# Patient Record
Sex: MALE | Race: WHITE | NOT HISPANIC OR LATINO | Employment: OTHER | URBAN - METROPOLITAN AREA
[De-identification: names, ages, dates, MRNs, and addresses within clinical notes are randomized per-mention and may not be internally consistent; named-entity substitution may affect disease eponyms.]

---

## 2018-01-30 ENCOUNTER — OFFICE VISIT CONVERTED (OUTPATIENT)
Dept: CARDIOLOGY | Facility: CLINIC | Age: 73
End: 2018-01-30
Attending: SPECIALIST

## 2019-02-01 ENCOUNTER — OFFICE VISIT CONVERTED (OUTPATIENT)
Dept: CARDIOLOGY | Facility: CLINIC | Age: 74
End: 2019-02-01
Attending: SPECIALIST

## 2020-05-01 ENCOUNTER — HOSPITAL ENCOUNTER (OUTPATIENT)
Dept: LAB | Facility: HOSPITAL | Age: 75
Discharge: HOME | End: 2020-05-01
Attending: FAMILY MEDICINE

## 2020-05-01 LAB
ALT SERPL-CCNC: 28 U/L (ref 10–40)
AST SERPL-CCNC: 28 U/L (ref 15–50)
CHOLEST SERPL-MCNC: 151 MG/DL (ref 107–200)
CHOLEST/HDLC SERPL: 2.4 {RATIO} (ref 3–6)
HDLC SERPL-MCNC: 62 MG/DL (ref 40–60)
LDLC SERPL CALC-MCNC: 78 MG/DL (ref 70–100)
TRIGL SERPL-MCNC: 55 MG/DL (ref 40–150)
VLDLC SERPL-MCNC: 11 MG/DL (ref 5–37)

## 2020-05-29 ENCOUNTER — CONVERSION ENCOUNTER (OUTPATIENT)
Dept: CARDIOLOGY | Facility: CLINIC | Age: 75
End: 2020-05-29

## 2020-05-29 ENCOUNTER — OFFICE VISIT CONVERTED (OUTPATIENT)
Dept: CARDIOLOGY | Facility: CLINIC | Age: 75
End: 2020-05-29
Attending: SPECIALIST

## 2020-07-01 ENCOUNTER — HOSPITAL ENCOUNTER (OUTPATIENT)
Dept: MRI IMAGING | Facility: HOSPITAL | Age: 75
Discharge: HOME | End: 2020-07-01
Attending: FAMILY MEDICINE

## 2020-08-20 ENCOUNTER — OFFICE VISIT CONVERTED (OUTPATIENT)
Dept: NEUROSURGERY | Facility: CLINIC | Age: 75
End: 2020-08-20
Attending: NEUROLOGICAL SURGERY

## 2020-10-23 ENCOUNTER — HOSPITAL ENCOUNTER (OUTPATIENT)
Dept: PREADMISSION TESTING | Facility: HOSPITAL | Age: 75
Discharge: HOME | End: 2020-10-23
Attending: NEUROLOGICAL SURGERY

## 2020-10-26 LAB — SARS-COV-2 RNA SPEC QL NAA+PROBE: NOT DETECTED

## 2020-10-28 ENCOUNTER — HOSPITAL ENCOUNTER (OUTPATIENT)
Dept: PERIOP | Facility: HOSPITAL | Age: 75
Setting detail: HOSPITAL OUTPATIENT SURGERY
Discharge: HOME | End: 2020-10-28
Attending: NEUROLOGICAL SURGERY

## 2020-11-19 ENCOUNTER — OFFICE VISIT CONVERTED (OUTPATIENT)
Dept: NEUROSURGERY | Facility: CLINIC | Age: 75
End: 2020-11-19
Attending: PHYSICIAN ASSISTANT

## 2021-01-13 ENCOUNTER — HOSPITAL ENCOUNTER (OUTPATIENT)
Dept: OTHER | Facility: HOSPITAL | Age: 76
Discharge: HOME | End: 2021-01-13
Attending: INTERNAL MEDICINE

## 2021-02-09 ENCOUNTER — HOSPITAL ENCOUNTER (OUTPATIENT)
Dept: VACCINE CLINIC | Facility: HOSPITAL | Age: 76
Discharge: HOME | End: 2021-02-09
Attending: INTERNAL MEDICINE

## 2021-05-10 NOTE — H&P
History and Physical      Patient Name: Danny Villafana   Patient ID: [id number]   Sex: Male   YOB: 1945    Primary Care Provider: Gurinder Marcos MD   Referring Provider: Gurinder Marcos MD    Visit Date: August 20, 2020    Provider: Sarmad Triplett MD   Location: [facility]   Location Address: [address] [place] [address]  [id number]   Location Phone: [phone]          Chief Complaint  · Back and right leg pain  · Surgical History and Physical      History Of Present Illness  The patient is a 74 year old /White male, who presents on referral from Gurinder Marcos MD, for a neurosurgical evaluation of low back pain and right leg pain.   The pain developed acutely several years ago. It went away with PT on 2 previous occasions and started to come back in June of this year (PT not helpful this time). It is 5-7/10 in severity has an aching, a burning, and quality and radiates into the bilateral lower leg (right greater than left, left mostly to the hip) in a nonspecific distribution The pain has been waxing and waning in severity. The pain tends to be maximal at no specific time, but waxes and wanes in severity throughout the day. The patient states the pain is aggravated by bending and leaning forward. It is alleviated by Etodolac.   He denies any additional symptoms. The patient has no prior history of neck or back surgery.   RECENT INTERVENTIONS:  He has been previously treated with multiple rounds of PT in the past, home exercises have not helped as much this time. Etodolac is somewhat helpful.   INFORMATION REVIEWED:  The following information was reviewed: radiology reports and images and. MRI of the lumbar spine and revealed spinal stenosis at L4-5 (severe) and L3-4 (moderate).       Past Medical History  Allergic rhinitis, chronic; Arthritis; Cancer; GERD; High cholesterol; Hyperlipidemia         Past Surgical History  *Metal  Implant; EYE SURGERY; MENISCUS TEAR         Medication List  Aspir-81 81 mg oral tablet,delayed release (DR/EC); atorvastatin 10 mg oral tablet; cetirizine 10 mg oral tablet; etodolac 500 mg oral tablet; fluticasone propionate 50 mcg/actuation nasal spray,suspension; Glucosamine Chondroitin 550-30-1 mg oral capsule; multivitamin oral capsule; resveratrol 250 mg oral capsule         Allergy List  SULFA (SULFONAMIDES)       Allergies Reconciled  Family Medical History  Diabetes, unspecified type; Family history of breast cancer; Family history of cancer; Family history of heart disease; Family history of diabetes mellitus; Family history of osteoporosis         Social History  Alcohol (Current some day); Caffeine (Current every day); lives with spouse; ; Second hand smoke exposure (Never); Tobacco (Former); Working         Review of Systems  · Constitutional  o Denies  o : chills, excessive sweating, fatigue, fever, sycope/passing out, weight gain, weight loss  · Eyes  o Denies  o : changes in vision, blurry vision, double vision  · HENT  o Admits  o : seasonal allergies  o Denies  o : loss of hearing, ringing in the ears, ear aches, sore throat, nasal congestion, sinus pain, nose bleeds  · Cardiovascular  o Denies  o : blood clots, swollen legs, anemia, easy burising or bleeding, transfusions  · Respiratory  o Denies  o : shortness of breath, dry cough, productive cough, pneumonia, COPD  · Gastrointestinal  o Admits  o : reflux  o Denies  o : difficulty swallowing  · Genitourinary  o Denies  o : incontinence  · Neurologic  o Admits  o : tremor  o Denies  o : headache, seizure, stroke, loss of balance, falls, dizziness/vertigo, difficulty with sleep, numbness/tingling/paresthesia , difficulty with coordination, difficulty with dexterity, weakness  · Musculoskeletal  o Admits  o : sciatica, pain radiating in leg, low back pain  o Denies  o : neck stiffness/pain, swollen lymph nodes, muscle aches, joint pain,  "weakness, spasms, pain radiating in arm  · Endocrine  o Denies  o : diabetes, thyroid disorder  · Psychiatric  o Denies  o : anxiety, depression      Vitals  Date Time BP Position Site L\R Cuff Size HR RR TEMP (F) WT  HT  BMI kg/m2 BSA m2 O2 Sat HC       08/20/2020 08:52 AM        96.7 171lbs 8oz 6'  1\" 22.63 2           Physical Examination  · Constitutional  o Appearance  o : well-nourished, well developed, alert, in no acute distress  · Respiratory  o Respiratory Effort  o : breathing unlabored  · Cardiovascular  o Peripheral Vascular System  o :   § Extremities  § : no edema or cyanosis  · Musculoskeletal  o Spine  o :   § Inspection/Palpation  § : no spinal tenderness or misalignment  o Right Lower Extremity  o :   § Inspection/Palpation  § : no joint or limb tenderness to palpation, no edema present, no ecchymosis  § Joint Stability  § : joint stability within normal limits  § Range of Motion  § : range of motion normal, no joint crepitations present, no pain on motion, Ghulam's test negative  o Left Lower Extremity  o :   § Inspection/Palpation  § : no joint or limb tenderness to palpation, no edema present, no ecchymosis  § Joint Stability  § : joint stability within normal limits  § Range of Motion  § : range of motion normal, no joint crepitations present, no pain on motion, Ghulam's test negative  · Skin and Subcutaneous Tissue  o Extremities  o :   § Right Lower Extremity  § : no lesions or areas of discoloration  § Left Lower Extremity  § : no lesions or areas of discoloration  o Back  o : no lesions or areas of discoloration  · Neurologic  o Mental Status Examination  o :   § Orientation  § : alert and oriented to time, person, place and events  o Motor Examination  o :   § RLE Strength  § : strength normal  § RLE Motor Function  § : tone normal, no atrophy, no abnormal movements noted  § LLE Strength  § : strength normal  § LLE Motor Function  § : tone normal, no atrophy, no abnormal movements " noted  o Reflexes  o :   § RLE  § : knee and ankle reflexes tr/4, SLR negative  § LLE  § : knee and ankle reflexes tr/4, SLR negative  o Sensation  o :   § Light Touch  § : sensation intact to light touch in extremities  o Gait and Station  o :   § Gait Screening  § : normal gait, able to stand without difficulty  · Psychiatric  o Mood and Affect  o : mood normal, affect appropriate              Assessment  · Lumbar Spinal Stenosis     724.02/M48.06  L4-5 moreso than L3-4  · Preoperative examination     V72.84/Z01.818    Problems Reconciled  Plan  · Orders  o PHYSICAL THERAPY CONSULTATION (PHYTH) - - 08/20/2020   Prefers PTA Lumbar spinal stenosis Evaluate and treat  · Medications  o Medications have been Reconciled  o Transition of Care or Provider Policy  · Instructions  o Encouraged to follow-up with Primary Care Provider for preventative care.  o The ROS and the PFSH were reviewed at today's visit.  o I have discussed the risks and benefits of surgery versus physical therapy, epidural steroids, and other conservative forms of treatment.  o He could consider a right approach for minimally invasive laminectomy at L3-4 and L4-5. His other option is PT.   o He would like to try a little course of PT and if not improving quickly consider surgery after October 17th. He will call with an update.  o ***Patient called back and has failed PT. Desires surgery***  o ****Surgical Orders****  o Outpatient  o RISK AND BENEFITS:  o Possible risks/complications, benefits and alternatives to surgical or invasive procedure have been explained to the patient and/or legal guardian.  o I certify that inpatient hospital services are in accordance with 42.C.F.R. & 412.3 i.e. the order.  o PREP: Per protocol;  o IV: Per Anesthesia;  o *******************************************  o PRE- OP MEDICATION ORDER:  o *******************************************  o Kefzol 2 grams IV on call to OR.  o ***************  o Date of Surgical Procedure:    o Please sign permit for:  o Minimally Invasive Laminectomy, right approach  o lumbar three to lumbar four  o lumbar four to lumbar five  o The above History and Physical must have been completed within 30 days of admission.            Electronically Signed by: Sarmad Triplett MD -Author on September 22, 2020 10:28:53 AM

## 2021-05-13 NOTE — PROGRESS NOTES
"   Progress Note      Patient Name: Danny Villafana   Patient ID: [id number]   Sex: Male   YOB: 1945    Primary Care Provider: Gurinder Marcos MD   Referring Provider: Gurinder Marcos MD    Visit Date: May 29, 2020    Provider: Levi Leiva MD   Location: [facility]   Location Address: [address] [place] [address]  [id number]   Location Phone: [phone]          Chief Complaint     Hyperlipidemia.  Hypertension.       History Of Present Illness  Danny Villafana is a 74 year old /White male with a history of hyperlipidemia and chest pain. No further chest pain. Blood pressure is running slightly on the higher side.   CURRENT MEDICATIONS: Aspirin 81 mg daily; atorvastatin 10 mg daily; cetirizine 10 mg daily; etodolac 500 mg 1/2 b.i.d.; fluticasone nasal spray daily; glucosamine-chondroitin 2 daily; multivitamin daily; resveratrol 300 mg daily. The dosage and frequency of the medications were reviewed with the patient.   PAST MEDICAL HISTORY: Arthritis; Atypical chest pain; Cancer; GERD; Hyperlipidemia.   FAMILY HISTORY: Positive for diabetes mellitus and heart disease. Negative for hypertension.   PSYCHOSOCIAL HISTORY: Denies mood changes or depression. Moderate alcohol consumption. Previously smoked, but quit. Walks daily for exercise.       Review of Systems  · Cardiovascular  o Admits  o : swelling (feet, ankles, hands)  o Denies  o : palpitations (fast, fluttering, or skipping beats), shortness of breath while walking or lying flat, chest pain or angina pectoris   · Respiratory  o Denies  o : chronic or frequent cough, asthma or wheezing      Vitals  Date Time BP Position Site L\R Cuff Size HR RR TEMP (F) WT  HT  BMI kg/m2 BSA m2 O2 Sat HC       05/29/2020 01:04 /80 Sitting    66 - R   172lbs 0oz 6'  1\" 22.69 2     05/29/2020 01:04 /78 Sitting                     Physical " Examination  · Constitutional  o Appearance  o : Awake, alert, cooperative, pleasant.  · Respiratory  o Inspection of Chest  o : No chest wall deformities, moving equal.  o Auscultation of Lungs  o : Good air entry with vesicular breath sounds.  · Cardiovascular  o Heart  o :   § Auscultation of Heart  § : S1 and S2 regular. No S3. No S4. No murmurs.  o Peripheral Vascular System  o :   § Extremities  § : Peripheral pulses were well felt. No edema. No cyanosis.  · Gastrointestinal  o Abdominal Examination  o : No masses or organomegaly noted.          Assessment     ASSESSMENT & PLAN:    1.  Atypical chest pain, stable.  2.  Hypertension, blood pressure running slightly high.  I asked him to monitor his blood pressure regularly.  If        persistently high, start him on losartan 50 mg once a day.    3.  Hyperlipidemia.  Continue current dose of Lipitor, managed by PMD.  4.  See me back in 6 months.             Electronically Signed by: Judi Hutchinson-, Other -Author on June 8, 2020 10:53:00 AM  Electronically Co-signed by: Levi Leiva MD -Reviewer on June 21, 2020 10:52:13 AM

## 2021-05-13 NOTE — PROGRESS NOTES
Progress Note      Patient Name: Danny Villafana   Patient ID: [id number]   Sex: Male   YOB: 1945    Primary Care Provider: Gurinder Marcos MD   Referring Provider: Gurinder Marcos MD    Visit Date: November 19, 2020    Provider: Inna Denny PA-C   Location: [facility]   Location Address: [address] [place] [address]  [id number]   Location Phone: [phone]          Chief Complaint  · Follow-up      History Of Present Illness  The patient is a 74 year old /White male who is in the office for followup appointment.      Here for three week post op visit s//p a right L3/4 and L4/5 MIL. He is doing well.  Denies leg pain.  Denies fever or drainage from the wound.       Past Medical History  Allergic rhinitis, chronic; Arthritis; Cancer; GERD; High cholesterol; Hyperlipidemia         Past Surgical History  *Metal Implant; EYE SURGERY; Lumbar laminectomy; MENISCUS TEAR         Medication List  Aspir-81 81 mg oral tablet,delayed release (DR/EC); atorvastatin 10 mg oral tablet; cetirizine 10 mg oral tablet; etodolac 500 mg oral tablet; fluticasone propionate 50 mcg/actuation nasal spray,suspension; Glucosamine Chondroitin 550-30-1 mg oral capsule; multivitamin oral capsule; resveratrol 250 mg oral capsule         Allergy List  SULFA (SULFONAMIDES)       Allergies Reconciled  Family Medical History  Diabetes, unspecified type; Family history of breast cancer; Family history of cancer; Family history of heart disease; Family history of diabetes mellitus; Family history of osteoporosis         Social History  Alcohol (Current some day); Caffeine (Current every day); lives with spouse; ; Second hand smoke exposure (Never); Tobacco (Former); Working         Review of Systems  · Constitutional  o Denies  o : chills, excessive sweating, fatigue, fever, sycope/passing out, weight gain, weight loss  · Eyes  o Denies  o : changes in vision,  blurry vision, double vision  · HENT  o Denies  o : loss of hearing, ringing in the ears, ear aches, sore throat, nasal congestion, sinus pain, nose bleeds, seasonal allergies  · Cardiovascular  o Denies  o : blood clots, swollen legs, anemia, easy burising or bleeding, transfusions  · Respiratory  o Denies  o : shortness of breath, dry cough, productive cough, pneumonia, COPD  · Gastrointestinal  o Denies  o : difficulty swallowing, reflux  · Genitourinary  o Denies  o : incontinence  · Neurologic  o Denies  o : headache, seizure, stroke, tremor, loss of balance, falls, dizziness/vertigo, difficulty with sleep, numbness/tingling/paresthesia , difficulty with coordination, difficulty with dexterity, weakness  · Musculoskeletal  o Admits  o : low back pain  o Denies  o : neck stiffness/pain, swollen lymph nodes, muscle aches, joint pain, weakness, spasms, sciatica, pain radiating in arm, pain radiating in leg  · Endocrine  o Denies  o : diabetes, thyroid disorder  · Psychiatric  o Denies  o : anxiety, depression  · All Others Negative      Vitals  Date Time BP Position Site L\R Cuff Size HR RR TEMP (F) WT  HT  BMI kg/m2 BSA m2 O2 Sat FR L/min FiO2        11/19/2020 03:31 /72 Sitting    80 - R  97.3 170lbs 5oz 6'   23.1 1.98             Physical Examination  · Constitutional  o Appearance  o : well-nourished, well developed, alert, in no acute distress  · Respiratory  o Respiratory Effort  o : breathing unlabored  · Cardiovascular  o Peripheral Vascular System  o :   § Extremities  § : no cyanosis, clubbing or edema  · Neurologic  o Mental Status Examination  o :   § Orientation  § : grossly oriented to person, place and time  o Motor Examination  o :   § RLE Strength  § : strength normal  § RLE Motor Function  § : tone normal, no atrophy, no abnormal movements noted  § LLE Strength  § : strength normal  § LLE Motor Function  § : tone normal, no atrophy, no abnormal movements noted  o Reflexes  o :    § RLE  § : 2/4 knee and ankle reflex  § LLE  § : 2/4 knee and ankle reflex  o Sensation  o :   § Light Touch  § : sensation intact to light touch in extremities  o Gait and Station  o :   § Gait Screening  § : gait within normal limits  · Psychiatric  o Mood and Affect  o : mood normal, affect appropriate     lumbar incisions have healed and no signs of infection           Assessment  · Lumbar Spinal Stenosis     724.02/M48.06  L4-5 moreso than L3-4 (now s/p MIL)      Plan  · Medications  o Medications have been Reconciled  o Transition of Care or Provider Policy  · Instructions  o The ROS and the PFSH were reviewed at today's visit.  o Call or return to office if symptoms worsen or persist.   o He will slowly increase activity over the next 2-3 weeks. F/U as needed.             Electronically Signed by: Inna Denny PA-C -Author on November 19, 2020 04:13:10 PM

## 2021-05-14 VITALS
SYSTOLIC BLOOD PRESSURE: 122 MMHG | BODY MASS INDEX: 23.07 KG/M2 | WEIGHT: 170.31 LBS | DIASTOLIC BLOOD PRESSURE: 72 MMHG | HEART RATE: 80 BPM | TEMPERATURE: 97.3 F | HEIGHT: 72 IN

## 2021-05-15 VITALS — HEIGHT: 73 IN | WEIGHT: 171.5 LBS | BODY MASS INDEX: 22.73 KG/M2 | TEMPERATURE: 96.7 F

## 2021-05-15 VITALS
SYSTOLIC BLOOD PRESSURE: 144 MMHG | WEIGHT: 172 LBS | DIASTOLIC BLOOD PRESSURE: 80 MMHG | BODY MASS INDEX: 22.8 KG/M2 | HEART RATE: 66 BPM | HEIGHT: 73 IN

## 2021-05-16 VITALS
HEIGHT: 73 IN | HEART RATE: 60 BPM | BODY MASS INDEX: 23.19 KG/M2 | SYSTOLIC BLOOD PRESSURE: 138 MMHG | WEIGHT: 175 LBS | DIASTOLIC BLOOD PRESSURE: 82 MMHG

## 2021-05-16 VITALS
HEIGHT: 73 IN | HEART RATE: 72 BPM | DIASTOLIC BLOOD PRESSURE: 80 MMHG | BODY MASS INDEX: 23.33 KG/M2 | WEIGHT: 176 LBS | SYSTOLIC BLOOD PRESSURE: 154 MMHG

## 2021-06-01 ENCOUNTER — CONVERSION ENCOUNTER (OUTPATIENT)
Dept: CARDIOLOGY | Facility: CLINIC | Age: 76
End: 2021-06-01

## 2021-06-01 ENCOUNTER — OFFICE VISIT CONVERTED (OUTPATIENT)
Dept: CARDIOLOGY | Facility: CLINIC | Age: 76
End: 2021-06-01
Attending: SPECIALIST

## 2021-06-05 NOTE — PROGRESS NOTES
Progress Note      Patient Name: Danny Villafana   Patient ID: [id number]   Sex: Male   YOB: 1945    Primary Care Provider: Gurinder Marcos MD   Referring Provider: Gurinder Marcos MD    Visit Date: June 1, 2021    Provider: Levi Leiva MD   Location: [facility]   Location Address: [address] [place] [address], [facility]   [place], [place]  [id number]   Location Phone: [phone]          Chief Complaint     Hypertension, hyperlipidemia.       History Of Present Illness  Danny Villafana is a 75 year old /White male with a history of hypertension and hyperlipidemia. No chest pain. No shortness of breath.   CURRENT MEDICATIONS: Medication list was reviewed and is as documented.   PAST MEDICAL HISTORY: Arthritis; Atypical chest pain; Cancer; GERD; Hyperlipidemia.   PSYCHOSOCIAL HISTORY: Moderate alcohol consumption. Previously smoked, but quit.      ALLERGIES:  Sulfa.       Review of Systems  · Cardiovascular  o Admits  o : swelling (feet, ankles, hands)  o Denies  o : palpitations (fast, fluttering, or skipping beats), shortness of breath while walking or lying flat, chest pain or angina pectoris   · Respiratory  o Denies  o : chronic or frequent cough      Vitals  Date Time BP Position Site L\R Cuff Size HR RR TEMP (F) WT  HT  BMI kg/m2 BSA m2 O2 Sat FR L/min FiO2 HC       06/01/2021 12:09 /76 Sitting    70 - R   168lbs 0oz 6'   22.78 1.97             Physical Examination  · Constitutional  o Appearance  o : Awake, alert, cooperative, pleasant.  · Respiratory  o Inspection of Chest  o : No chest wall deformities, moving equal.  o Auscultation of Lungs  o : Clear.  · Cardiovascular  o Heart  o :   § Auscultation of Heart  § : S1 and S2 regular. No S3. No S4.   o Peripheral Vascular System  o :   § Extremities  § : No edema.   · Gastrointestinal  o Abdominal Examination  o : No masses or organomegaly noted.  · EKG  o EKG  o : Performed in the office  today.  o Indications  o : Hypertension.  o Results  o : Sinus rhythm, low voltage.   o Comparison  o : No change from previous EKG.           Assessment     ASSESSMENT AND PLAN:  1. Essential hypertension, controlled.  Hypertensive cardiovascular disease without heart failure.  Start him on Losartan 50 mg once a day and be on a low salt diet.    2. Hyperlipidemia.  Continue Lipitor.  Lipid profile managed by PMD.    3. See me back in six months.    MD ALEKSEY Roberts/lm                Electronically Signed by: Dary Campbell-, Other -Author on June 2, 2021 07:57:18 AM  Electronically Co-signed by: Levi Leiva MD -Reviewer on June 2, 2021 12:38:32 PM

## 2021-07-15 VITALS
BODY MASS INDEX: 22.75 KG/M2 | HEIGHT: 72 IN | WEIGHT: 168 LBS | SYSTOLIC BLOOD PRESSURE: 146 MMHG | HEART RATE: 70 BPM | DIASTOLIC BLOOD PRESSURE: 76 MMHG

## 2021-10-09 PROCEDURE — 87086 URINE CULTURE/COLONY COUNT: CPT | Performed by: FAMILY MEDICINE

## 2021-11-29 ENCOUNTER — TRANSCRIBE ORDERS (OUTPATIENT)
Dept: ADMINISTRATIVE | Facility: HOSPITAL | Age: 76
End: 2021-11-29

## 2021-11-29 DIAGNOSIS — M54.42 LOW BACK PAIN DUE TO BILATERAL SCIATICA: Primary | ICD-10-CM

## 2021-11-29 DIAGNOSIS — M54.41 LOW BACK PAIN DUE TO BILATERAL SCIATICA: Primary | ICD-10-CM

## 2021-12-03 ENCOUNTER — HOSPITAL ENCOUNTER (OUTPATIENT)
Dept: MRI IMAGING | Facility: HOSPITAL | Age: 76
Discharge: HOME | End: 2021-12-03
Admitting: FAMILY MEDICINE

## 2021-12-03 DIAGNOSIS — M54.41 LOW BACK PAIN DUE TO BILATERAL SCIATICA: ICD-10-CM

## 2021-12-03 DIAGNOSIS — M54.42 LOW BACK PAIN DUE TO BILATERAL SCIATICA: ICD-10-CM

## 2021-12-03 PROCEDURE — 72148 MRI LUMBAR SPINE W/O DYE: CPT

## 2022-01-20 PROBLEM — I10 HYPERTENSION, ESSENTIAL: Status: ACTIVE | Noted: 2022-01-20

## 2022-01-20 PROBLEM — E78.2 HYPERLIPEMIA, MIXED: Status: ACTIVE | Noted: 2022-01-20

## 2022-01-20 NOTE — PROGRESS NOTES
[facility]  Cardiology progress Note    Patient Name: Danny Villafana  : 1945    CHIEF COMPLAINT  Hypertension      Subjective   Subjective     HISTORY OF PRESENT ILLNESS    Danny Villafana is a 76 y.o. male with history of hypertension and hyperlipidemia.  No chest pain or shortness of breath.    Review of Systems:   Constitutional no fever,  no weight loss   Skin no rash   Otolaryngeal no difficulty swallowing   Cardiovascular See HPI   Pulmonary no cough, no sputum production   Gastrointestinal no constipation, no diarrhea   Genitourinary no dysuria, no hematuria   Hematologic no easy bruisability, no abnormal bleeding   Musculoskeletal no muscle pain   Neurologic no dizziness, no falls         Personal History     Social History:  reports that he has never smoked. He has never used smokeless tobacco. He reports previous alcohol use. He reports that he does not use drugs.    Home Medications:  Current Outpatient Medications on File Prior to Visit   Medication Sig   • aspirin (aspirin) 81 MG EC tablet Aspir-81 81 mg oral tablet,delayed release (DR/EC) take 1 tablet (81 mg) by oral route once daily   Active   • atorvastatin (LIPITOR) 10 MG tablet atorvastatin 10 mg oral tablet take 1 tablet (10 mg) by oral route once daily at bedtime   Active   • Cetirizine HCl 10 MG capsule Take 1 capsule by mouth Daily.   • etodolac XL (LODINE XL) 500 MG 24 hr tablet Take 250 mg by mouth.   • fluticasone (FLONASE) 50 MCG/ACT nasal spray fluticasone propionate 50 mcg/actuation nasal spray,suspension spray 1 spray (50 mcg) in each nostril by intranasal route once daily   Active   • [DISCONTINUED] losartan (COZAAR) 50 MG tablet Take 50 mg by mouth Daily.   • [DISCONTINUED] ciprofloxacin (CIPRO) 500 MG tablet Take 1 tablet by mouth 2 (Two) Times a Day.     No current facility-administered medications on file prior to visit.     Allergies:  Allergies   Allergen Reactions   • Sulfa Antibiotics Rash       Objective     Objective       Vitals:   Heart Rate:  [76-80] 80  BP: (164-168)/(66-70) 168/66  Body mass index is 23.6 kg/m².     Physical Exam:   Constitutional: Awake, alert, No acute distress    Eyes: PERRLA, sclerae anicteric, no conjunctival injection   HENT: NCAT, mucous membranes moist   Neck: Supple, no thyromegaly, no lymphadenopathy, trachea midline   Respiratory: Clear to auscultation bilaterally, nonlabored respirations    Cardiovascular: RRR, no murmurs or rubs. Palpable pedal pulses bilaterally   Musculoskeletal: No bilateral ankle edema, no cyanosis to extremities   Psychiatric: Appropriate affect, cooperative   Neurologic: Oriented x 3, strength symmetric in all extremities, Cranial Nerves grossly intact to confrontation, speech clear   Skin: No rashes.    Result Review    Result Review:  I have personally reviewed the available results from  [x]  Laboratory  [x]  EKG  [x]  Cardiology  [x]  Medications  [x]  Old records  []  Other:   Procedures  No results found for: CHOL  Lab Results   Component Value Date    TRIG 55 05/01/2020     Lab Results   Component Value Date    HDL 62 (H) 05/01/2020     Lab Results   Component Value Date    LDL 78 05/01/2020     Lab Results   Component Value Date    VLDL 11 05/01/2020        Impression/Plan:  1.  Essential hypertension Uncontrolled: He says his blood pressure is high in view of his back pain.  Increase losartan 200 mg a day.  Monitor blood pressure regularly at home.  If blood pressures persistently high will add amlodipine 5 mg once a day.  2.  Hyperlipidemia: Continue Lipitor 10 mg once a day.  Lipid profile reviewed.           Levi Leiva MD   01/21/22   09:33 EST

## 2022-01-21 ENCOUNTER — OFFICE VISIT (OUTPATIENT)
Dept: CARDIOLOGY | Facility: CLINIC | Age: 77
End: 2022-01-21

## 2022-01-21 VITALS
HEART RATE: 80 BPM | HEIGHT: 72 IN | BODY MASS INDEX: 23.57 KG/M2 | SYSTOLIC BLOOD PRESSURE: 168 MMHG | WEIGHT: 174 LBS | DIASTOLIC BLOOD PRESSURE: 66 MMHG

## 2022-01-21 DIAGNOSIS — E78.2 HYPERLIPEMIA, MIXED: ICD-10-CM

## 2022-01-21 DIAGNOSIS — I10 HYPERTENSION, ESSENTIAL: Primary | ICD-10-CM

## 2022-01-21 PROCEDURE — 99214 OFFICE O/P EST MOD 30 MIN: CPT | Performed by: SPECIALIST

## 2022-01-21 RX ORDER — LOSARTAN POTASSIUM 100 MG/1
100 TABLET ORAL DAILY
Qty: 90 TABLET | Refills: 3 | Status: SHIPPED | OUTPATIENT
Start: 2022-01-21 | End: 2022-08-19 | Stop reason: SDUPTHER

## 2022-03-08 ENCOUNTER — OFFICE VISIT (OUTPATIENT)
Dept: NEUROSURGERY | Facility: CLINIC | Age: 77
End: 2022-03-08

## 2022-03-08 VITALS
WEIGHT: 175.8 LBS | BODY MASS INDEX: 23.81 KG/M2 | DIASTOLIC BLOOD PRESSURE: 67 MMHG | HEIGHT: 72 IN | SYSTOLIC BLOOD PRESSURE: 142 MMHG

## 2022-03-08 DIAGNOSIS — M51.26 HERNIATED NUCLEUS PULPOSUS, L4-5 LEFT: Primary | ICD-10-CM

## 2022-03-08 PROCEDURE — 99213 OFFICE O/P EST LOW 20 MIN: CPT | Performed by: NEUROLOGICAL SURGERY

## 2022-03-08 RX ORDER — PHENOL 1.4 %
AEROSOL, SPRAY (ML) MUCOUS MEMBRANE
COMMUNITY

## 2022-03-08 RX ORDER — MULTIVITAMIN/IRON/FOLIC ACID 18MG-0.4MG
TABLET ORAL
COMMUNITY
End: 2022-08-19 | Stop reason: ALTCHOICE

## 2022-03-08 RX ORDER — ACETAMINOPHEN 500 MG
500 TABLET ORAL EVERY 6 HOURS PRN
COMMUNITY

## 2022-03-08 RX ORDER — GLUCOSAMINE SULFATE 500 MG
CAPSULE ORAL
COMMUNITY

## 2022-03-08 RX ORDER — MULTIPLE VITAMINS W/ MINERALS TAB 9MG-400MCG
1 TAB ORAL DAILY
COMMUNITY

## 2022-03-08 NOTE — PROGRESS NOTES
Danny Villafana is a 76 y.o. male that presents with Back Pain       He has had pain into the left leg since October when lifting a battery from the boat. It initially went to just below the ankle. He had surgery on the right side in 10/2020 (right approach L3-4 and L4-5 MIL).      Review of Systems   Musculoskeletal: Positive for back pain and myalgias.        Vitals:    03/08/22 1358   BP: 142/67        Physical Exam  Constitutional:       Appearance: He is normal weight.   Musculoskeletal:         General: No tenderness.      Comments: SLR-   Neurological:      Mental Status: He is alert.      Sensory: No sensory deficit.      Motor: No weakness.      Deep Tendon Reflexes: Reflexes normal (1/4).   Psychiatric:         Mood and Affect: Mood normal.        I personally reviewed the patient's MRI scan which shows a superior left L4-5 disc herniation.       Assessment and Plan {CC Problem List  Visit Diagnosis  ROS  Review (Popup)  Health Maintenance  Quality  BestPractice  Medications  SmartSets  SnapShot Encounters  Media :23}   Problem List Items Addressed This Visit    None     Visit Diagnoses     Herniated nucleus pulposus, L4-5 left    -  Primary      As his symptoms have improved somewhat, he would like to hold off on surgery (Left L4-5 min invasive discectomy). He will call with worsened pain. He could consider LESB.    Follow Up {Instructions Charge Capture  Follow-up Communications :23}   Return if symptoms worsen or fail to improve.

## 2022-08-18 NOTE — PROGRESS NOTES
[facility]  Cardiology progress Note    Patient Name: Danny Villafana  : 1945    CHIEF COMPLAINT  Hypertension        Subjective   Subjective     HISTORY OF PRESENT ILLNESS    Danny Villafana is a 76 y.o. male with history of hypertension hyperlipidemia.  No chest pain or shortness of breath.    REVIEW OF SYSTEMS    Constitutional:    No fever, no weight loss  Skin:     No rash  Otolaryngeal:    No difficulty swallowing  Cardiovascular:  No chest pain or shortness of breath  Pulmonary:    No cough, no sputum production    Personal History     Social History:    reports that he quit smoking about 51 years ago. His smoking use included cigarettes. He started smoking about 57 years ago. He has a 6.00 pack-year smoking history. He has never used smokeless tobacco. He reports previous alcohol use. He reports that he does not use drugs.    Home Medications:  Current Outpatient Medications on File Prior to Visit   Medication Sig   • acetaminophen (TYLENOL) 500 MG tablet Take 500 mg by mouth Every 6 (Six) Hours As Needed for Mild Pain .   • aspirin 81 MG EC tablet Aspir-81 81 mg oral tablet,delayed release (DR/EC) take 1 tablet (81 mg) by oral route once daily   Active   • atorvastatin (LIPITOR) 10 MG tablet atorvastatin 10 mg oral tablet take 1 tablet (10 mg) by oral route once daily at bedtime   Active   • Cetirizine HCl 10 MG capsule Take 1 capsule by mouth Daily.   • etodolac XL (LODINE XL) 500 MG 24 hr tablet Take 250 mg by mouth.   • fluticasone (FLONASE) 50 MCG/ACT nasal spray fluticasone propionate 50 mcg/actuation nasal spray,suspension spray 1 spray (50 mcg) in each nostril by intranasal route once daily   Active   • Glucosamine 500 MG capsule Take  by mouth.   • multivitamin with minerals tablet tablet Take 1 tablet by mouth Daily.   • Resveratrol 250 MG capsule Take  by mouth.   • [DISCONTINUED] losartan (COZAAR) 100 MG tablet Take 1 tablet by mouth Daily.   • [DISCONTINUED]  Glucosamine-Chondroitin 9738-2378 MG/30ML liquid Take  by mouth.     No current facility-administered medications on file prior to visit.       Past Medical History:   Diagnosis Date   • Arthritis 2006   • Cancer (HCC)    • Hyperlipidemia    • Hypertension    • Low back pain    • Lumbosacral disc disease 10/06/21       Allergies:  Allergies   Allergen Reactions   • Shellfish-Derived Products Hives   • Sulfa Antibiotics Rash       Objective    Objective       Vitals:   Heart Rate:  [68] 68  BP: (122)/(76) 122/76  Body mass index is 23.46 kg/m².     PHYSICAL EXAM:    General Appearance:   · well developed  · well nourished  HENT:   · oropharynx moist  · lips not cyanotic  Neck:  · thyroid not enlarged  · supple  Respiratory:  · no respiratory distress  · normal breath sounds  · no rales  Cardiovascular:  · no jugular venous distention  · regular rhythm  · apical impulse normal  · S1 normal, S2 normal  · no S3, no S4   · no murmur  · no rub, no thrill  · carotid pulses normal; no bruit  · pedal pulses normal  · lower extremity edema: none    Skin:   · warm, dry  Psychiatric:  · judgement and insight appropriate  · normal mood and affect        Result Review:  I have personally reviewed the available results from  [x]  Laboratory  [x]  EKG  [x]  Cardiology  [x]  Medications  [x]  Old records  []  Other:        Impression/Plan:  1.  Essential hypertension controlled: Continue losartan 100 mg a day.  Blood pressure controlled at home.  2.  Hyperlipidemia: Continue Lipitor 10 mg a day.  Monitor lipid and hepatic profile.           Levi Leiva MD   08/19/22   09:32 EDT

## 2022-08-19 ENCOUNTER — OFFICE VISIT (OUTPATIENT)
Dept: CARDIOLOGY | Facility: CLINIC | Age: 77
End: 2022-08-19

## 2022-08-19 VITALS
DIASTOLIC BLOOD PRESSURE: 76 MMHG | BODY MASS INDEX: 23.43 KG/M2 | HEART RATE: 68 BPM | HEIGHT: 72 IN | SYSTOLIC BLOOD PRESSURE: 122 MMHG | WEIGHT: 173 LBS

## 2022-08-19 DIAGNOSIS — I10 HYPERTENSION, ESSENTIAL: Primary | ICD-10-CM

## 2022-08-19 DIAGNOSIS — E78.2 HYPERLIPEMIA, MIXED: ICD-10-CM

## 2022-08-19 PROCEDURE — 99213 OFFICE O/P EST LOW 20 MIN: CPT | Performed by: SPECIALIST

## 2022-08-19 RX ORDER — LOSARTAN POTASSIUM 100 MG/1
100 TABLET ORAL DAILY
Qty: 90 TABLET | Refills: 3 | Status: SHIPPED | OUTPATIENT
Start: 2022-08-19

## 2023-08-18 NOTE — PROGRESS NOTES
[facility]  Cardiology progress Note    Patient Name: Danny Villafana  : 1945    CHIEF COMPLAINT  Hypertension        Subjective   Subjective     HISTORY OF PRESENT ILLNESS    Danny Villafana is a 77 y.o. male with hypertension and hyperlipidemia.  No chest pain or shortness of breath.    REVIEW OF SYSTEMS    Constitutional:    No fever, no weight loss  Skin:     No rash  Otolaryngeal:    No difficulty swallowing  Cardiovascular: See HPI.  Pulmonary:    No cough, no sputum production    Personal History     Social History:    reports that he quit smoking about 52 years ago. His smoking use included cigarettes. He started smoking about 58 years ago. He has a 6.00 pack-year smoking history. He has never used smokeless tobacco. He reports that he does not currently use alcohol. He reports that he does not use drugs.    Home Medications:  Current Outpatient Medications on File Prior to Visit   Medication Sig    aspirin 81 MG EC tablet Aspir-81 81 mg oral tablet,delayed release (DR/EC) take 1 tablet (81 mg) by oral route once daily   Active    atorvastatin (LIPITOR) 10 MG tablet atorvastatin 10 mg oral tablet take 1 tablet (10 mg) by oral route once daily at bedtime   Active    Cetirizine HCl 10 MG capsule Take 1 capsule by mouth Daily.    etodolac XL (LODINE XL) 500 MG 24 hr tablet Take 250 mg by mouth.    fluticasone (FLONASE) 50 MCG/ACT nasal spray fluticasone propionate 50 mcg/actuation nasal spray,suspension spray 1 spray (50 mcg) in each nostril by intranasal route once daily   Active    Glucosamine 500 MG capsule Take  by mouth.    losartan (COZAAR) 100 MG tablet Take 1 tablet by mouth Daily.    multivitamin with minerals tablet tablet Take 1 tablet by mouth Daily.    [DISCONTINUED] acetaminophen (TYLENOL) 500 MG tablet Take 500 mg by mouth Every 6 (Six) Hours As Needed for Mild Pain .    [DISCONTINUED] Resveratrol 250 MG capsule Take  by mouth. (Patient not taking: Reported on 2023)      No current facility-administered medications on file prior to visit.       Past Medical History:   Diagnosis Date    Arthritis 2006    Cancer     Hyperlipidemia     Hypertension     Low back pain     Lumbosacral disc disease 10/06/21       Allergies:  Allergies   Allergen Reactions    Shellfish-Derived Products Hives    Sulfa Antibiotics Rash       Objective    Objective       Vitals:   Heart Rate:  [67] 67  BP: (117)/(70) 117/70  Body mass index is 22.76 kg/mý.     PHYSICAL EXAM:    General Appearance:   well developed  well nourished  HENT:   oropharynx moist  lips not cyanotic  Neck:  thyroid not enlarged  supple  Respiratory:  no respiratory distress  normal breath sounds  no rales  Cardiovascular:  no jugular venous distention  regular rhythm  apical impulse normal  S1 normal, S2 normal  no S3, no S4   no murmur  no rub, no thrill  carotid pulses normal; no bruit  pedal pulses normal  lower extremity edema: none    Skin:   warm, dry  Psychiatric:  judgement and insight appropriate  normal mood and affect        Result Review:  I have personally reviewed the available results from  [x]  Laboratory  [x]  EKG  [x]  Cardiology  [x]  Medications  [x]  Old records  []  Other:     Procedures         Impression/Plan:  1.  Essential hypertension controlled: Continue losartan 100 mg once a day.  Blood pressure controlled at home.  2.  Hyperlipidemia: Continue Lipitor 10 mg once a day.  Labs reviewed shows an LDL of 69.  Monitor lipid and hepatic profile.           Levi Leiva MD   08/22/23   08:58 EDT

## 2023-08-22 ENCOUNTER — OFFICE VISIT (OUTPATIENT)
Dept: CARDIOLOGY | Facility: CLINIC | Age: 78
End: 2023-08-22
Payer: MEDICARE

## 2023-08-22 VITALS
HEART RATE: 67 BPM | BODY MASS INDEX: 22.73 KG/M2 | DIASTOLIC BLOOD PRESSURE: 70 MMHG | HEIGHT: 72 IN | WEIGHT: 167.8 LBS | SYSTOLIC BLOOD PRESSURE: 117 MMHG

## 2023-08-22 DIAGNOSIS — E78.2 HYPERLIPEMIA, MIXED: ICD-10-CM

## 2023-08-22 DIAGNOSIS — I10 HYPERTENSION, ESSENTIAL: Primary | ICD-10-CM

## 2023-08-22 PROCEDURE — 1160F RVW MEDS BY RX/DR IN RCRD: CPT | Performed by: SPECIALIST

## 2023-08-22 PROCEDURE — 99213 OFFICE O/P EST LOW 20 MIN: CPT | Performed by: SPECIALIST

## 2023-08-22 PROCEDURE — 3074F SYST BP LT 130 MM HG: CPT | Performed by: SPECIALIST

## 2023-08-22 PROCEDURE — 1159F MED LIST DOCD IN RCRD: CPT | Performed by: SPECIALIST

## 2023-08-22 PROCEDURE — 3078F DIAST BP <80 MM HG: CPT | Performed by: SPECIALIST

## 2023-11-06 RX ORDER — LOSARTAN POTASSIUM 100 MG/1
100 TABLET ORAL DAILY
Qty: 90 TABLET | Refills: 3 | Status: SHIPPED | OUTPATIENT
Start: 2023-11-06

## 2023-12-04 ENCOUNTER — TRANSCRIBE ORDERS (OUTPATIENT)
Dept: ADMINISTRATIVE | Facility: HOSPITAL | Age: 78
End: 2023-12-04
Payer: MEDICARE

## 2023-12-04 DIAGNOSIS — Z13.6 SCREENING FOR ISCHEMIC HEART DISEASE: Primary | ICD-10-CM

## 2024-08-19 NOTE — PROGRESS NOTES
[facility]  Cardiology progress Note    Patient Name: Danny Villafana  : 1945    CHIEF COMPLAINT  Hypertension        Subjective   Subjective     HISTORY OF PRESENT ILLNESS    Danny Villafana is a 78 y.o. male with history of hypertension.  No chest pain or shortness of breath.  Complain of pedal edema off-and-on    REVIEW OF SYSTEMS    Constitutional:    No fever, no weight loss  Skin:     No rash  Otolaryngeal:    No difficulty swallowing  Cardiovascular: See HPI.  Pulmonary:    No cough, no sputum production    Personal History     Social History:    reports that he quit smoking about 53 years ago. His smoking use included cigarettes. He started smoking about 59 years ago. He has a 6.2 pack-year smoking history. He has never used smokeless tobacco. He reports that he does not currently use alcohol. He reports that he does not use drugs.    Home Medications:  Current Outpatient Medications on File Prior to Visit   Medication Sig    aspirin 81 MG EC tablet Aspir-81 81 mg oral tablet,delayed release (DR/EC) take 1 tablet (81 mg) by oral route once daily   Active    atorvastatin (LIPITOR) 10 MG tablet atorvastatin 10 mg oral tablet take 1 tablet (10 mg) by oral route once daily at bedtime   Active    Cetirizine HCl 10 MG capsule Take 1 capsule by mouth Daily.    etodolac XL (LODINE XL) 500 MG 24 hr tablet Take 250 mg by mouth.    fluticasone (FLONASE) 50 MCG/ACT nasal spray fluticasone propionate 50 mcg/actuation nasal spray,suspension spray 1 spray (50 mcg) in each nostril by intranasal route once daily   Active    Glucosamine 500 MG capsule Take  by mouth.    losartan (COZAAR) 100 MG tablet Take 1 tablet by mouth once daily    multivitamin with minerals tablet tablet Take 1 tablet by mouth Daily.     No current facility-administered medications on file prior to visit.       Past Medical History:   Diagnosis Date    Arthritis 2006    Cancer     Hyperlipidemia     Hypertension     Low back pain      Lumbosacral disc disease 10/06/21       Allergies:  Allergies   Allergen Reactions    Shellfish-Derived Products Hives    Sulfa Antibiotics Rash       Objective    Objective       Vitals:   Heart Rate:  [66] 66  BP: (132)/(70) 132/70  Body mass index is 23.6 kg/m².     PHYSICAL EXAM:    General Appearance:   well developed  well nourished  HENT:   oropharynx moist  lips not cyanotic  Neck:  thyroid not enlarged  supple  Respiratory:  no respiratory distress  normal breath sounds  no rales  Cardiovascular:  no jugular venous distention  regular rhythm  apical impulse normal  S1 normal, S2 normal  no S3, no S4   no murmur  no rub, no thrill  carotid pulses normal; no bruit  pedal pulses normal  lower extremity edema: none    Skin:   warm, dry  Psychiatric:  judgement and insight appropriate  normal mood and affect        Result Review:  I have personally reviewed the available results from  [x]  Laboratory  [x]  EKG  [x]  Cardiology  [x]  Medications  [x]  Old records  []  Other:       ECG 12 Lead    Date/Time: 8/22/2024 10:13 AM  Performed by: Levi Leiav MD    Authorized by: Levi Leiva MD  Comparison: compared with previous ECG   Similar to previous ECG  Rhythm: sinus rhythm  Rate: normal  QRS axis: normal  Other findings: non-specific ST-T wave changes  Comments: Normal sinus rhythm.  No significant changes compared to previous EKG          Impression/Plan:  1.  Mixed hyperlipidemia: Continue Lipitor 10 mg once a day.  Monitor lipid and hepatic profile.  2.  Essential hypertension controlled: Continue losartan 100 mg once a day.  Monitor blood pressure regularly.  3.  Pedal edema: Low-salt diet advised.  Add hydrochlorothiazide 12.5 mg as needed.  Echocardiogram.        Levi Leiva MD   08/22/24   10:08 EDT

## 2024-08-22 ENCOUNTER — OFFICE VISIT (OUTPATIENT)
Dept: CARDIOLOGY | Facility: CLINIC | Age: 79
End: 2024-08-22
Payer: MEDICARE

## 2024-08-22 VITALS
HEART RATE: 66 BPM | BODY MASS INDEX: 23.57 KG/M2 | HEIGHT: 72 IN | SYSTOLIC BLOOD PRESSURE: 132 MMHG | DIASTOLIC BLOOD PRESSURE: 70 MMHG | WEIGHT: 174 LBS

## 2024-08-22 DIAGNOSIS — R60.0 PEDAL EDEMA: ICD-10-CM

## 2024-08-22 DIAGNOSIS — E78.2 HYPERLIPEMIA, MIXED: ICD-10-CM

## 2024-08-22 DIAGNOSIS — I10 HYPERTENSION, ESSENTIAL: Primary | ICD-10-CM

## 2024-08-22 PROCEDURE — 3075F SYST BP GE 130 - 139MM HG: CPT | Performed by: SPECIALIST

## 2024-08-22 PROCEDURE — 3078F DIAST BP <80 MM HG: CPT | Performed by: SPECIALIST

## 2024-08-22 PROCEDURE — 99214 OFFICE O/P EST MOD 30 MIN: CPT | Performed by: SPECIALIST

## 2024-08-22 PROCEDURE — 1159F MED LIST DOCD IN RCRD: CPT | Performed by: SPECIALIST

## 2024-08-22 PROCEDURE — 93000 ELECTROCARDIOGRAM COMPLETE: CPT | Performed by: SPECIALIST

## 2024-08-22 PROCEDURE — 1160F RVW MEDS BY RX/DR IN RCRD: CPT | Performed by: SPECIALIST

## 2024-08-22 RX ORDER — HYDROCHLOROTHIAZIDE 25 MG/1
25 TABLET ORAL
Qty: 90 TABLET | Refills: 3 | Status: SHIPPED | OUTPATIENT
Start: 2024-08-22

## 2024-09-11 ENCOUNTER — HOSPITAL ENCOUNTER (OUTPATIENT)
Dept: CARDIOLOGY | Facility: HOSPITAL | Age: 79
Discharge: HOME | End: 2024-09-11
Admitting: SPECIALIST
Payer: MEDICARE

## 2024-09-11 DIAGNOSIS — R60.0 PEDAL EDEMA: ICD-10-CM

## 2024-09-11 LAB
ASCENDING AORTA: 3.3 CM
BH CV ECHO MEAS - AO MAX PG: 7.3 MMHG
BH CV ECHO MEAS - AO MEAN PG: 4.2 MMHG
BH CV ECHO MEAS - AO ROOT DIAM: 3 CM
BH CV ECHO MEAS - AO V2 MAX: 134.9 CM/SEC
BH CV ECHO MEAS - AO V2 VTI: 29.4 CM
BH CV ECHO MEAS - AVA(I,D): 2.6 CM2
BH CV ECHO MEAS - EDV(MOD-SP2): 73.3 ML
BH CV ECHO MEAS - EDV(MOD-SP4): 70.2 ML
BH CV ECHO MEAS - EF(MOD-BP): 62 %
BH CV ECHO MEAS - EF(MOD-SP2): 63 %
BH CV ECHO MEAS - EF(MOD-SP4): 59.1 %
BH CV ECHO MEAS - ESV(MOD-SP2): 27.1 ML
BH CV ECHO MEAS - ESV(MOD-SP4): 28.7 ML
BH CV ECHO MEAS - IVS/LVPW: 1 CM
BH CV ECHO MEAS - IVSD: 0.9 CM
BH CV ECHO MEAS - LA DIMENSION: 3.5 CM
BH CV ECHO MEAS - LAT PEAK E' VEL: 14.1 CM/SEC
BH CV ECHO MEAS - LV DIASTOLIC VOL/BSA (35-75): 34.9 CM2
BH CV ECHO MEAS - LV MAX PG: 4.8 MMHG
BH CV ECHO MEAS - LV MEAN PG: 2.4 MMHG
BH CV ECHO MEAS - LV SYSTOLIC VOL/BSA (12-30): 14.3 CM2
BH CV ECHO MEAS - LV V1 MAX: 110 CM/SEC
BH CV ECHO MEAS - LV V1 VTI: 24.4 CM
BH CV ECHO MEAS - LVIDD: 4.4 CM
BH CV ECHO MEAS - LVIDS: 2.6 CM
BH CV ECHO MEAS - LVOT DIAM: 2 CM
BH CV ECHO MEAS - LVPWD: 0.9 CM
BH CV ECHO MEAS - MED PEAK E' VEL: 9.1 CM/SEC
BH CV ECHO MEAS - MV A MAX VEL: 72 CM/SEC
BH CV ECHO MEAS - MV DEC SLOPE: 373 CM/SEC2
BH CV ECHO MEAS - MV DEC TIME: 217 SEC
BH CV ECHO MEAS - MV E MAX VEL: 70.3 CM/SEC
BH CV ECHO MEAS - MV E/A: 0.98
BH CV ECHO MEAS - MV MAX PG: 3.1 MMHG
BH CV ECHO MEAS - MV MEAN PG: 1.4 MMHG
BH CV ECHO MEAS - MV P1/2T: 70 MSEC
BH CV ECHO MEAS - MV V2 VTI: 30.3 CM
BH CV ECHO MEAS - MVA(P1/2T): 3.1 CM2
BH CV ECHO MEAS - RAP SYSTOLE: 3 MMHG
BH CV ECHO MEAS - RVDD: 2.39 CM
BH CV ECHO MEAS - RVSP: 32.8 MMHG
BH CV ECHO MEAS - SV(MOD-SP2): 46.2 ML
BH CV ECHO MEAS - SV(MOD-SP4): 41.5 ML
BH CV ECHO MEAS - SVI(MOD-SP2): 23 ML/M2
BH CV ECHO MEAS - SVI(MOD-SP4): 20.7 ML/M2
BH CV ECHO MEAS - TR MAX PG: 29.8 MMHG
BH CV ECHO MEAS - TR MAX VEL: 269.9 CM/SEC
BH CV ECHO MEASUREMENTS AVERAGE E/E' RATIO: 6.06

## 2024-09-11 PROCEDURE — 93306 TTE W/DOPPLER COMPLETE: CPT

## 2024-11-04 RX ORDER — LOSARTAN POTASSIUM 100 MG/1
100 TABLET ORAL DAILY
Qty: 90 TABLET | Refills: 0 | Status: SHIPPED | OUTPATIENT
Start: 2024-11-04

## 2025-02-04 RX ORDER — LOSARTAN POTASSIUM 100 MG/1
100 TABLET ORAL DAILY
Qty: 90 TABLET | Refills: 1 | Status: SHIPPED | OUTPATIENT
Start: 2025-02-04

## 2025-08-14 ENCOUNTER — OFFICE VISIT (OUTPATIENT)
Dept: CARDIOLOGY | Facility: CLINIC | Age: 80
End: 2025-08-14
Payer: MEDICARE

## 2025-08-14 VITALS
BODY MASS INDEX: 23.6 KG/M2 | DIASTOLIC BLOOD PRESSURE: 79 MMHG | HEIGHT: 72 IN | SYSTOLIC BLOOD PRESSURE: 137 MMHG | HEART RATE: 67 BPM | OXYGEN SATURATION: 96 %

## 2025-08-14 DIAGNOSIS — I10 HYPERTENSION, ESSENTIAL: Primary | ICD-10-CM

## 2025-08-14 DIAGNOSIS — E78.2 HYPERLIPEMIA, MIXED: ICD-10-CM

## 2025-08-14 PROCEDURE — 1160F RVW MEDS BY RX/DR IN RCRD: CPT | Performed by: SPECIALIST

## 2025-08-14 PROCEDURE — 3078F DIAST BP <80 MM HG: CPT | Performed by: SPECIALIST

## 2025-08-14 PROCEDURE — 1159F MED LIST DOCD IN RCRD: CPT | Performed by: SPECIALIST

## 2025-08-14 PROCEDURE — 99214 OFFICE O/P EST MOD 30 MIN: CPT | Performed by: SPECIALIST

## 2025-08-14 PROCEDURE — 3075F SYST BP GE 130 - 139MM HG: CPT | Performed by: SPECIALIST

## 2025-08-14 RX ORDER — TRIAMCINOLONE ACETONIDE 1 MG/G
OINTMENT TOPICAL
COMMUNITY

## 2025-08-14 RX ORDER — LOSARTAN POTASSIUM 100 MG/1
100 TABLET ORAL DAILY
Qty: 90 TABLET | Refills: 1 | Status: SHIPPED | OUTPATIENT
Start: 2025-08-14

## 2025-08-14 RX ORDER — NICOTINE POLACRILEX 2 MG
GUM BUCCAL
COMMUNITY
Start: 2025-03-17